# Patient Record
Sex: MALE | Race: WHITE | Employment: OTHER | URBAN - METROPOLITAN AREA
[De-identification: names, ages, dates, MRNs, and addresses within clinical notes are randomized per-mention and may not be internally consistent; named-entity substitution may affect disease eponyms.]

---

## 2019-01-25 ENCOUNTER — HOSPITAL ENCOUNTER (OUTPATIENT)
Age: 64
Discharge: HOME | End: 2019-01-25
Payer: COMMERCIAL

## 2019-01-25 LAB
INR BLD: >10.1 INDEX
PROTHROMBIN TIME: 120 SECONDS (ref 9.12–12.5)

## 2019-01-25 PROCEDURE — 36415 COLL VENOUS BLD VENIPUNCTURE: CPT

## 2019-01-25 PROCEDURE — 85610 PROTHROMBIN TIME: CPT

## 2019-07-07 ENCOUNTER — APPOINTMENT (OUTPATIENT)
Dept: CT IMAGING | Age: 64
DRG: 896 | End: 2019-07-07
Payer: COMMERCIAL

## 2019-07-07 ENCOUNTER — HOSPITAL ENCOUNTER (INPATIENT)
Age: 64
LOS: 4 days | Discharge: NURSING, REHAB OR LONG-TERM CARE | DRG: 896 | End: 2019-07-11
Attending: EMERGENCY MEDICINE | Admitting: INTERNAL MEDICINE
Payer: COMMERCIAL

## 2019-07-07 ENCOUNTER — APPOINTMENT (OUTPATIENT)
Dept: GENERAL RADIOLOGY | Age: 64
DRG: 896 | End: 2019-07-07
Payer: COMMERCIAL

## 2019-07-07 DIAGNOSIS — E87.6 HYPOKALEMIA: ICD-10-CM

## 2019-07-07 DIAGNOSIS — R41.82 ALTERED MENTAL STATUS, UNSPECIFIED ALTERED MENTAL STATUS TYPE: Primary | ICD-10-CM

## 2019-07-07 DIAGNOSIS — R45.851 SUICIDAL IDEATION: ICD-10-CM

## 2019-07-07 DIAGNOSIS — F32.A DEPRESSION, UNSPECIFIED DEPRESSION TYPE: ICD-10-CM

## 2019-07-07 DIAGNOSIS — N17.9 ACUTE RENAL FAILURE, UNSPECIFIED ACUTE RENAL FAILURE TYPE (HCC): ICD-10-CM

## 2019-07-07 LAB
ACETAMINOPHEN LEVEL: <5 UG/ML (ref 15–30)
ALBUMIN SERPL-MCNC: 4.1 GM/DL (ref 3.4–5)
ALCOHOL SCREEN SERUM: NORMAL %WT/VOL
ALP BLD-CCNC: 58 IU/L (ref 40–128)
ALT SERPL-CCNC: 12 U/L (ref 10–40)
AMMONIA: 32 UMOL/L (ref 16–60)
AMPHETAMINES: NEGATIVE
ANION GAP SERPL CALCULATED.3IONS-SCNC: 18 MMOL/L (ref 4–16)
AST SERPL-CCNC: 25 IU/L (ref 15–37)
BACTERIA: NEGATIVE /HPF
BARBITURATE SCREEN URINE: NEGATIVE
BASOPHILS ABSOLUTE: 0 K/CU MM
BASOPHILS RELATIVE PERCENT: 0.2 % (ref 0–1)
BENZODIAZEPINE SCREEN, URINE: NEGATIVE
BILIRUB SERPL-MCNC: 0.7 MG/DL (ref 0–1)
BILIRUBIN URINE: NEGATIVE MG/DL
BLOOD, URINE: ABNORMAL
BUN BLDV-MCNC: 51 MG/DL (ref 6–23)
CALCIUM SERPL-MCNC: 8.8 MG/DL (ref 8.3–10.6)
CANNABINOID SCREEN URINE: NEGATIVE
CHLORIDE BLD-SCNC: 83 MMOL/L (ref 99–110)
CLARITY: CLEAR
CO2: 29 MMOL/L (ref 21–32)
COCAINE METABOLITE: NEGATIVE
COLOR: ABNORMAL
CREAT SERPL-MCNC: 3.9 MG/DL (ref 0.9–1.3)
DIFFERENTIAL TYPE: ABNORMAL
DOSE AMOUNT: ABNORMAL
DOSE AMOUNT: ABNORMAL
DOSE TIME: ABNORMAL
DOSE TIME: ABNORMAL
EOSINOPHILS ABSOLUTE: 0 K/CU MM
EOSINOPHILS RELATIVE PERCENT: 0.2 % (ref 0–3)
GFR AFRICAN AMERICAN: 19 ML/MIN/1.73M2
GFR NON-AFRICAN AMERICAN: 16 ML/MIN/1.73M2
GLUCOSE BLD-MCNC: 105 MG/DL (ref 70–99)
GLUCOSE, URINE: NEGATIVE MG/DL
HCT VFR BLD CALC: 39 % (ref 42–52)
HEMOGLOBIN: 13.2 GM/DL (ref 13.5–18)
IMMATURE NEUTROPHIL %: 0.5 % (ref 0–0.43)
INR BLD: 8.71 INDEX
KETONES, URINE: NEGATIVE MG/DL
LACTATE: 1.3 MMOL/L (ref 0.4–2)
LEUKOCYTE ESTERASE, URINE: NEGATIVE
LIPASE: 70 IU/L (ref 13–60)
LYMPHOCYTES ABSOLUTE: 1 K/CU MM
LYMPHOCYTES RELATIVE PERCENT: 15.5 % (ref 24–44)
MCH RBC QN AUTO: 32.1 PG (ref 27–31)
MCHC RBC AUTO-ENTMCNC: 33.8 % (ref 32–36)
MCV RBC AUTO: 94.9 FL (ref 78–100)
MONOCYTES ABSOLUTE: 0.7 K/CU MM
MONOCYTES RELATIVE PERCENT: 11.4 % (ref 0–4)
NITRITE URINE, QUANTITATIVE: NEGATIVE
NUCLEATED RBC %: 0 %
OPIATES, URINE: NEGATIVE
OXYCODONE: NORMAL
PDW BLD-RTO: 11.7 % (ref 11.7–14.9)
PH, URINE: 5 (ref 5–8)
PHENCYCLIDINE, URINE: NEGATIVE
PLATELET # BLD: 173 K/CU MM (ref 140–440)
PMV BLD AUTO: 9.9 FL (ref 7.5–11.1)
POTASSIUM SERPL-SCNC: ABNORMAL MMOL/L (ref 3.5–5.1)
PRO-BNP: 2148 PG/ML
PROTEIN UA: NEGATIVE MG/DL
PROTHROMBIN TIME: 97.2 SECONDS (ref 9.12–12.5)
RBC # BLD: 4.11 M/CU MM (ref 4.6–6.2)
RBC URINE: ABNORMAL /HPF (ref 0–3)
SALICYLATE LEVEL: <0.3 MG/DL (ref 15–30)
SEGMENTED NEUTROPHILS ABSOLUTE COUNT: 4.6 K/CU MM
SEGMENTED NEUTROPHILS RELATIVE PERCENT: 72.2 % (ref 36–66)
SODIUM BLD-SCNC: 130 MMOL/L (ref 135–145)
SPECIFIC GRAVITY UA: 1 (ref 1–1.03)
TOTAL CK: 343 IU/L (ref 38–174)
TOTAL IMMATURE NEUTOROPHIL: 0.03 K/CU MM
TOTAL NUCLEATED RBC: 0 K/CU MM
TOTAL PROTEIN: 6.6 GM/DL (ref 6.4–8.2)
TRICHOMONAS: ABNORMAL /HPF
TROPONIN T: 0.01 NG/ML
TROPONIN T: <0.01 NG/ML
TSH HIGH SENSITIVITY: 1.05 UIU/ML (ref 0.27–4.2)
UROBILINOGEN, URINE: NORMAL MG/DL (ref 0.2–1)
WBC # BLD: 6.4 K/CU MM (ref 4–10.5)
WBC UA: <1 /HPF (ref 0–2)

## 2019-07-07 PROCEDURE — 70450 CT HEAD/BRAIN W/O DYE: CPT

## 2019-07-07 PROCEDURE — 6360000002 HC RX W HCPCS: Performed by: EMERGENCY MEDICINE

## 2019-07-07 PROCEDURE — 83605 ASSAY OF LACTIC ACID: CPT

## 2019-07-07 PROCEDURE — 96365 THER/PROPH/DIAG IV INF INIT: CPT

## 2019-07-07 PROCEDURE — 85025 COMPLETE CBC W/AUTO DIFF WBC: CPT

## 2019-07-07 PROCEDURE — 83735 ASSAY OF MAGNESIUM: CPT

## 2019-07-07 PROCEDURE — 85610 PROTHROMBIN TIME: CPT

## 2019-07-07 PROCEDURE — 2580000003 HC RX 258: Performed by: EMERGENCY MEDICINE

## 2019-07-07 PROCEDURE — 36415 COLL VENOUS BLD VENIPUNCTURE: CPT

## 2019-07-07 PROCEDURE — 84443 ASSAY THYROID STIM HORMONE: CPT

## 2019-07-07 PROCEDURE — 83880 ASSAY OF NATRIURETIC PEPTIDE: CPT

## 2019-07-07 PROCEDURE — 93005 ELECTROCARDIOGRAM TRACING: CPT | Performed by: EMERGENCY MEDICINE

## 2019-07-07 PROCEDURE — G0480 DRUG TEST DEF 1-7 CLASSES: HCPCS

## 2019-07-07 PROCEDURE — 96368 THER/DIAG CONCURRENT INF: CPT

## 2019-07-07 PROCEDURE — 1200000000 HC SEMI PRIVATE

## 2019-07-07 PROCEDURE — 87086 URINE CULTURE/COLONY COUNT: CPT

## 2019-07-07 PROCEDURE — 2500000003 HC RX 250 WO HCPCS: Performed by: EMERGENCY MEDICINE

## 2019-07-07 PROCEDURE — 96366 THER/PROPH/DIAG IV INF ADDON: CPT

## 2019-07-07 PROCEDURE — 6360000002 HC RX W HCPCS: Performed by: INTERNAL MEDICINE

## 2019-07-07 PROCEDURE — 82140 ASSAY OF AMMONIA: CPT

## 2019-07-07 PROCEDURE — 81001 URINALYSIS AUTO W/SCOPE: CPT

## 2019-07-07 PROCEDURE — 6370000000 HC RX 637 (ALT 250 FOR IP): Performed by: EMERGENCY MEDICINE

## 2019-07-07 PROCEDURE — 84484 ASSAY OF TROPONIN QUANT: CPT

## 2019-07-07 PROCEDURE — 82550 ASSAY OF CK (CPK): CPT

## 2019-07-07 PROCEDURE — 2580000003 HC RX 258: Performed by: INTERNAL MEDICINE

## 2019-07-07 PROCEDURE — 6370000000 HC RX 637 (ALT 250 FOR IP): Performed by: NURSE PRACTITIONER

## 2019-07-07 PROCEDURE — 80307 DRUG TEST PRSMV CHEM ANLYZR: CPT

## 2019-07-07 PROCEDURE — 71045 X-RAY EXAM CHEST 1 VIEW: CPT

## 2019-07-07 PROCEDURE — 99291 CRITICAL CARE FIRST HOUR: CPT

## 2019-07-07 PROCEDURE — 83690 ASSAY OF LIPASE: CPT

## 2019-07-07 PROCEDURE — 80053 COMPREHEN METABOLIC PANEL: CPT

## 2019-07-07 RX ORDER — POTASSIUM CHLORIDE 1.5 G/1.77G
40 POWDER, FOR SOLUTION ORAL PRN
Status: DISCONTINUED | OUTPATIENT
Start: 2019-07-07 | End: 2019-07-11 | Stop reason: HOSPADM

## 2019-07-07 RX ORDER — LORAZEPAM 1 MG/1
1 TABLET ORAL
Status: DISCONTINUED | OUTPATIENT
Start: 2019-07-07 | End: 2019-07-07

## 2019-07-07 RX ORDER — LORAZEPAM 1 MG/1
2 TABLET ORAL
Status: DISCONTINUED | OUTPATIENT
Start: 2019-07-07 | End: 2019-07-07

## 2019-07-07 RX ORDER — LORAZEPAM 2 MG/ML
4 INJECTION INTRAMUSCULAR
Status: DISCONTINUED | OUTPATIENT
Start: 2019-07-07 | End: 2019-07-07

## 2019-07-07 RX ORDER — POTASSIUM CHLORIDE 7.45 MG/ML
10 INJECTION INTRAVENOUS ONCE
Status: COMPLETED | OUTPATIENT
Start: 2019-07-07 | End: 2019-07-07

## 2019-07-07 RX ORDER — MAGNESIUM SULFATE IN WATER 40 MG/ML
2 INJECTION, SOLUTION INTRAVENOUS ONCE
Status: DISCONTINUED | OUTPATIENT
Start: 2019-07-08 | End: 2019-07-08

## 2019-07-07 RX ORDER — POTASSIUM CHLORIDE AND SODIUM CHLORIDE 900; 300 MG/100ML; MG/100ML
INJECTION, SOLUTION INTRAVENOUS CONTINUOUS
Status: DISCONTINUED | OUTPATIENT
Start: 2019-07-07 | End: 2019-07-09

## 2019-07-07 RX ORDER — LORAZEPAM 1 MG/1
4 TABLET ORAL
Status: DISCONTINUED | OUTPATIENT
Start: 2019-07-07 | End: 2019-07-07

## 2019-07-07 RX ORDER — 0.9 % SODIUM CHLORIDE 0.9 %
1000 INTRAVENOUS SOLUTION INTRAVENOUS ONCE
Status: COMPLETED | OUTPATIENT
Start: 2019-07-07 | End: 2019-07-07

## 2019-07-07 RX ORDER — LORAZEPAM 2 MG/ML
1 INJECTION INTRAMUSCULAR
Status: DISCONTINUED | OUTPATIENT
Start: 2019-07-07 | End: 2019-07-07

## 2019-07-07 RX ORDER — SODIUM CHLORIDE 0.9 % (FLUSH) 0.9 %
10 SYRINGE (ML) INJECTION PRN
Status: DISCONTINUED | OUTPATIENT
Start: 2019-07-07 | End: 2019-07-11 | Stop reason: HOSPADM

## 2019-07-07 RX ORDER — POTASSIUM CHLORIDE 20 MEQ/1
40 TABLET, EXTENDED RELEASE ORAL PRN
Status: DISCONTINUED | OUTPATIENT
Start: 2019-07-07 | End: 2019-07-11 | Stop reason: HOSPADM

## 2019-07-07 RX ORDER — POTASSIUM CHLORIDE 7.45 MG/ML
10 INJECTION INTRAVENOUS PRN
Status: DISCONTINUED | OUTPATIENT
Start: 2019-07-07 | End: 2019-07-11 | Stop reason: HOSPADM

## 2019-07-07 RX ORDER — LORAZEPAM 2 MG/ML
3 INJECTION INTRAMUSCULAR
Status: DISCONTINUED | OUTPATIENT
Start: 2019-07-07 | End: 2019-07-07

## 2019-07-07 RX ORDER — 0.9 % SODIUM CHLORIDE 0.9 %
500 INTRAVENOUS SOLUTION INTRAVENOUS ONCE
Status: COMPLETED | OUTPATIENT
Start: 2019-07-07 | End: 2019-07-08

## 2019-07-07 RX ORDER — SODIUM CHLORIDE 0.9 % (FLUSH) 0.9 %
10 SYRINGE (ML) INJECTION EVERY 12 HOURS SCHEDULED
Status: DISCONTINUED | OUTPATIENT
Start: 2019-07-07 | End: 2019-07-11 | Stop reason: HOSPADM

## 2019-07-07 RX ORDER — LORAZEPAM 2 MG/ML
2 INJECTION INTRAMUSCULAR
Status: DISCONTINUED | OUTPATIENT
Start: 2019-07-07 | End: 2019-07-07

## 2019-07-07 RX ORDER — DIAZEPAM 5 MG/1
10 TABLET ORAL ONCE
Status: COMPLETED | OUTPATIENT
Start: 2019-07-07 | End: 2019-07-07

## 2019-07-07 RX ORDER — LORAZEPAM 1 MG/1
3 TABLET ORAL
Status: DISCONTINUED | OUTPATIENT
Start: 2019-07-07 | End: 2019-07-07

## 2019-07-07 RX ORDER — WARFARIN SODIUM 1 MG/1
1 TABLET ORAL
COMMUNITY

## 2019-07-07 RX ORDER — POTASSIUM CHLORIDE 20 MEQ/1
20 TABLET, EXTENDED RELEASE ORAL ONCE
Status: COMPLETED | OUTPATIENT
Start: 2019-07-07 | End: 2019-07-07

## 2019-07-07 RX ADMIN — POTASSIUM CHLORIDE 10 MEQ: 7.46 INJECTION, SOLUTION INTRAVENOUS at 18:49

## 2019-07-07 RX ADMIN — POTASSIUM CHLORIDE AND SODIUM CHLORIDE 1 ML: 900; 300 INJECTION, SOLUTION INTRAVENOUS at 21:43

## 2019-07-07 RX ADMIN — FOLIC ACID: 5 INJECTION, SOLUTION INTRAMUSCULAR; INTRAVENOUS; SUBCUTANEOUS at 17:52

## 2019-07-07 RX ADMIN — DIAZEPAM 10 MG: 5 TABLET ORAL at 21:47

## 2019-07-07 RX ADMIN — SODIUM CHLORIDE 1000 ML: 9 INJECTION, SOLUTION INTRAVENOUS at 19:23

## 2019-07-07 RX ADMIN — SODIUM CHLORIDE 500 ML: 9 INJECTION, SOLUTION INTRAVENOUS at 23:42

## 2019-07-07 RX ADMIN — POTASSIUM CHLORIDE 20 MEQ: 20 TABLET, EXTENDED RELEASE ORAL at 18:49

## 2019-07-07 RX ADMIN — SODIUM CHLORIDE 1000 ML: 9 INJECTION, SOLUTION INTRAVENOUS at 17:51

## 2019-07-07 ASSESSMENT — ENCOUNTER SYMPTOMS
ALLERGIC/IMMUNOLOGIC NEGATIVE: 1
EYES NEGATIVE: 1
RESPIRATORY NEGATIVE: 1
GASTROINTESTINAL NEGATIVE: 1

## 2019-07-07 NOTE — ED PROVIDER NOTES
status:      Spouse name: Not on file    Number of children: Not on file    Years of education: Not on file    Highest education level: Not on file   Occupational History    Not on file   Social Needs    Financial resource strain: Not on file    Food insecurity:     Worry: Not on file     Inability: Not on file    Transportation needs:     Medical: Not on file     Non-medical: Not on file   Tobacco Use    Smoking status: Former Smoker     Years: 40.00     Types: Cigarettes   Substance and Sexual Activity    Alcohol use: No     Comment: daily \"multiple beers \"     Drug use: No    Sexual activity: Not on file   Lifestyle    Physical activity:     Days per week: Not on file     Minutes per session: Not on file    Stress: Not on file   Relationships    Social connections:     Talks on phone: Not on file     Gets together: Not on file     Attends Orthodoxy service: Not on file     Active member of club or organization: Not on file     Attends meetings of clubs or organizations: Not on file     Relationship status: Not on file    Intimate partner violence:     Fear of current or ex partner: Not on file     Emotionally abused: Not on file     Physically abused: Not on file     Forced sexual activity: Not on file   Other Topics Concern    Not on file   Social History Narrative    Not on file     Current Facility-Administered Medications   Medication Dose Route Frequency Provider Last Rate Last Dose    0.9 % sodium chloride bolus  1,000 mL Intravenous Once Cassandra Ann, DO 1,000 mL/hr at 07/07/19 1751 1,000 mL at 07/07/19 1751    sodium chloride flush 0.9 % injection 10 mL  10 mL Intravenous 2 times per day Cassandra Ann DO        sodium chloride flush 0.9 % injection 10 mL  10 mL Intravenous PRN Cassandra Ann DO        sodium chloride 0.9 % 3,025 mL with folic acid 1 mg, adult multi-vitamin with vitamin k 10 mL, thiamine 100 mg   Intravenous Daily Albert Winston,  mL/hr at sodium chloride flush 0.9 % injection 10 mL  10 mL Intravenous PRN Theodore Bannister, DO        sodium chloride 0.9 % 1,681 mL with folic acid 1 mg, adult multi-vitamin with vitamin k 10 mL, thiamine 100 mg   Intravenous Daily Theodore Bannister,  mL/hr at 07/07/19 1752      LORazepam (ATIVAN) tablet 1 mg  1 mg Oral Q1H PRN Theodore Bannister, DO        Or    LORazepam (ATIVAN) injection 1 mg  1 mg Intravenous Q1H PRN Theodore Bannister, DO        Or    LORazepam (ATIVAN) tablet 2 mg  2 mg Oral Q1H PRN Theodore Bannister, DO        Or    LORazepam (ATIVAN) injection 2 mg  2 mg Intravenous Q1H PRN Theodore Bannister, DO        Or    LORazepam (ATIVAN) tablet 3 mg  3 mg Oral Q1H PRN Theodore Bannister, DO        Or    LORazepam (ATIVAN) injection 3 mg  3 mg Intravenous Q1H PRN Theodore Bannister, DO        Or    LORazepam (ATIVAN) tablet 4 mg  4 mg Oral Q1H PRN Theodore Bannister, DO        Or    LORazepam (ATIVAN) injection 4 mg  4 mg Intravenous Q1H PRN Albert Winston, DO        0.9 % sodium chloride bolus  1,000 mL Intravenous Once Theodore Bannister, DO        potassium chloride 10 mEq/100 mL IVPB (Peripheral Line)  10 mEq Intravenous Once Theodore Bannister, DO        potassium chloride (KLOR-CON M) extended release tablet 20 mEq  20 mEq Oral Once Theodore Bannister, DO         Current Outpatient Medications   Medication Sig Dispense Refill    aspirin 81 MG tablet Take 81 mg by mouth daily.  diltiazem (CARDIZEM) 30 MG tablet Take 2 tablets by mouth 3 times daily. 90 tablet 1    clopidogrel (PLAVIX) 75 MG tablet Take 75 mg by mouth daily.  Rosuvastatin Calcium (CRESTOR PO) Take 40 mg by mouth.  enalapril (VASOTEC) 2.5 MG tablet Take 2.5 mg by mouth daily.  furosemide (LASIX) 40 MG tablet Take 20 mg by mouth daily.          Nursing Notes Reviewed    VITAL SIGNS:  ED Triage Vitals [07/07/19 1895]   Enc Vitals Group      BP 98/63      Pulse 74      Resp 16      Temp 97.4 °F (36.3 °C)      Temp Source Oral SpO2 100 %      Weight 140 lb (63.5 kg)      Height 5' 10\" (1.778 m)      Head Circumference       Peak Flow       Pain Score       Pain Loc       Pain Edu? Excl. in [address]? PHYSICAL EXAM:  Physical Exam   Constitutional: He appears well-developed and well-nourished. He is active and cooperative. Non-toxic appearance. He does not have a sickly appearance. He does not appear ill. No distress. HENT:   Head: Normocephalic and atraumatic. Right Ear: External ear normal.   Left Ear: External ear normal.   Mouth/Throat: Oropharynx is clear and moist. No oropharyngeal exudate. Eyes: Pupils are equal, round, and reactive to light. Conjunctivae and EOM are normal. Right eye exhibits no discharge. Left eye exhibits no discharge. No scleral icterus. Neck: Normal range of motion, full passive range of motion without pain and phonation normal. No JVD present. No neck rigidity. No edema, no erythema and normal range of motion present. Cardiovascular: Normal rate, regular rhythm, normal heart sounds and intact distal pulses. Exam reveals no gallop and no friction rub. No murmur heard. Pulmonary/Chest: Effort normal and breath sounds normal. No stridor. No respiratory distress. He has no wheezes. He has no rales. Abdominal: Soft. Bowel sounds are normal. He exhibits no distension and no mass. There is no tenderness. There is no rigidity, no rebound, no guarding, no tenderness at McBurney's point and negative Shahid's sign. Musculoskeletal: Normal range of motion. He exhibits no edema, tenderness or deformity. Neurological: He is alert. He has normal strength. He is disoriented. He displays no atrophy and no tremor. No cranial nerve deficit or sensory deficit. He exhibits normal muscle tone. He displays no seizure activity. Coordination normal. GCS eye subscore is 4. GCS verbal subscore is 5. GCS motor subscore is 6. Skin: Skin is warm. No rash noted. He is not diaphoretic. No erythema. No pallor. Psychiatric: His speech is delayed. He is slowed. Cognition and memory are normal. He expresses impulsivity. He exhibits a depressed mood. He expresses suicidal ideation. Nursing note and vitals reviewed.         I have reviewed andinterpreted all of the currently available lab results from this visit (if applicable):    Results for orders placed or performed during the hospital encounter of 07/07/19   CBC Auto Differential   Result Value Ref Range    WBC 6.4 4.0 - 10.5 K/CU MM    RBC 4.11 (L) 4.6 - 6.2 M/CU MM    Hemoglobin 13.2 (L) 13.5 - 18.0 GM/DL    Hematocrit 39.0 (L) 42 - 52 %    MCV 94.9 78 - 100 FL    MCH 32.1 (H) 27 - 31 PG    MCHC 33.8 32.0 - 36.0 %    RDW 11.7 11.7 - 14.9 %    Platelets 390 951 - 045 K/CU MM    MPV 9.9 7.5 - 11.1 FL    Differential Type AUTOMATED DIFFERENTIAL     Segs Relative 72.2 (H) 36 - 66 %    Lymphocytes % 15.5 (L) 24 - 44 %    Monocytes % 11.4 (H) 0 - 4 %    Eosinophils % 0.2 0 - 3 %    Basophils % 0.2 0 - 1 %    Segs Absolute 4.6 K/CU MM    Lymphocytes # 1.0 K/CU MM    Monocytes # 0.7 K/CU MM    Eosinophils # 0.0 K/CU MM    Basophils # 0.0 K/CU MM    Nucleated RBC % 0.0 %    Total Nucleated RBC 0.0 K/CU MM    Total Immature Neutrophil 0.03 K/CU MM    Immature Neutrophil % 0.5 (H) 0 - 0.43 %   CMP   Result Value Ref Range    Sodium 130 (L) 135 - 145 MMOL/L    Potassium (LL) 3.5 - 5.1 MMOL/L     2.8  K CALLED TO DR BOOKER VIA [facility] ON [id number] AT 1820 BY MAUREEN MT      Chloride 83 (L) 99 - 110 mMol/L    CO2 29 21 - 32 MMOL/L    BUN 51 (H) 6 - 23 MG/DL    CREATININE 3.9 (H) 0.9 - 1.3 MG/DL    Glucose 105 (H) 70 - 99 MG/DL    Calcium 8.8 8.3 - 10.6 MG/DL    Alb 4.1 3.4 - 5.0 GM/DL    Total Protein 6.6 6.4 - 8.2 GM/DL    Total Bilirubin 0.7 0.0 - 1.0 MG/DL    ALT 12 10 - 40 U/L    AST 25 15 - 37 IU/L    Alkaline Phosphatase 58 40 - 128 IU/L    GFR Non- 16 (L) >60 mL/min/1.73m2    GFR  19 (L) >60 mL/min/1.73m2    Anion Gap 18 (H) 4 - 16   Troponin

## 2019-07-07 NOTE — ED NOTES
Pt changed into a green gown and sitter at bedside.  Pt's clothes, wallet, and vap pen sent home with wife per pt request.      Herve Herrera RN  07/07/19 [id number]

## 2019-07-07 NOTE — ED NOTES
1828 paged hospitalist     Jamia Arboleda  07/07/19 1827  [phone] hospitalist returned call      Jamia Arboleda  07/07/19 0846

## 2019-07-07 NOTE — H&P
LORazepam (ATIVAN) injection 4 mg  4 mg Intravenous Q1H PRN Albert Winston, DO        0.9 % sodium chloride bolus  1,000 mL Intravenous Once Manny Ortega, DO         Current Outpatient Medications   Medication Sig Dispense Refill    aspirin 81 MG tablet Take 81 mg by mouth daily.  diltiazem (CARDIZEM) 30 MG tablet Take 2 tablets by mouth 3 times daily. 90 tablet 1    clopidogrel (PLAVIX) 75 MG tablet Take 75 mg by mouth daily.  Rosuvastatin Calcium (CRESTOR PO) Take 40 mg by mouth.  enalapril (VASOTEC) 2.5 MG tablet Take 2.5 mg by mouth daily.  furosemide (LASIX) 40 MG tablet Take 20 mg by mouth daily. Allergies  Allergies   Allergen Reactions    Lorazepam Other (See Comments)    Pcn [Penicillins]        REVIEW OF SYSTEMS   Within above limitations. 14 point review of systems reviewed. Pertinent positive or negative as per HPI or otherwise negative per 14 point systems review. PHYSICAL EXAM     Wt Readings from Last 3 Encounters:   07/07/19 140 lb (63.5 kg)       Blood pressure 109/80, pulse 77, temperature 97.4 °F (36.3 °C), temperature source Oral, resp. rate 16, height 5' 10\" (1.778 m), weight 140 lb (63.5 kg), SpO2 100 %. General - Awake for altered sensorium  Psych - Appropriate affect/speech. No agitation  Eyes - Eye lids intact. No scleral icterus  ENT - Lips wnl. External ear clear/dry/intact. No thyromegaly on inspection  Neuro - No gross peripheral or central neuro deficits on inspection  Heart - Sinus. RRR. S1 and S2 present. No elevated JVD appreciated  Lung - Adequate air entry b/l, No crackles/wheezes appreciated  GI - Soft. No guarding/rigidity.  BS+   - No CVA/suprapubic tenderness or palpable bladder distension    LABS AND IMAGING   CBC  [unfilled]    Last 3 Hemoglobin  Lab Results   Component Value Date    HGB 13.2 07/07/2019    HGB 12.9 02/09/2015    HGB 11.7 01/16/2015     Last 3 WBC/ANC  Lab Results   Component Value Date    WBC 6.4 07/07/2019 visualized portion of the orbits demonstrate no acute abnormality. SINUSES:  The visualized paranasal sinuses and mastoid air cells are clear. SOFT TISSUES/SKULL:  No acute abnormality of the visualized skull or soft  tissues.     Impression:       There is a rounded area of increased attenuation associated with the right  middle cerebral artery that is suggestive of an aneurysm.  CTA of the  intracranial circulation is suggested to further evaluate. Cerebral atrophy.  Chronic small vessel ischemic changes.  Atherosclerotic  calcification of the cavernous carotid arteries and vertebral arteries.     XR CHEST PORTABLE [[id number]] Collected: 07/07/19 1734     Order Status: Completed Updated: 07/07/19 1737     Narrative:       EXAMINATION:  ONE XRAY VIEW OF THE CHEST    7/7/2019 5:11 pm    COMPARISON:  02/09/2015    HISTORY:  ORDERING SYSTEM PROVIDED HISTORY: AMS  TECHNOLOGIST PROVIDED HISTORY:  Reason for exam:->AMS  Reason for Exam: AMS;suicidal  Acuity: Unknown  Type of Exam: Unknown    Initial evaluation. FINDINGS:  The patient has a left-sided pacemaker.  The trachea is midline.  The cardiac  silhouette is unremarkable.  There is stable elevation of the right  hemidiaphragm with mild scarring in the right lung base.  No obvious  superimposed acute process is identified. Espiridion Amel is scarring or a bulla in  the left lung base.     Impression:       Pacemaker.  No acute cardiopulmonary process.  Scarring in the lung bases. EKG personally reviewed with rate 73, NSR      Relevant labs and imaging reviewed    ASSESSMENT AND PLAN     Chronic alcoholism, stopped drinking 6 weeks ago now with hallucination, delusions, acute metabolic encephalopathy  - denies active suicidal ideations to me  - consult psych to assist with evaluation of organic disorder e.g psychosis  - NC-CT head with possible incidental aneurysm - CTA head in the ED pending.  This is likely incidental and unlike to explain presentation  - TSH

## 2019-07-08 PROBLEM — F33.1 MODERATE EPISODE OF RECURRENT MAJOR DEPRESSIVE DISORDER (HCC): Chronic | Status: ACTIVE | Noted: 2019-07-08

## 2019-07-08 PROBLEM — F41.1 GENERALIZED ANXIETY DISORDER: Chronic | Status: ACTIVE | Noted: 2019-07-08

## 2019-07-08 LAB
ALBUMIN SERPL-MCNC: 3.4 GM/DL (ref 3.4–5)
ALP BLD-CCNC: 50 IU/L (ref 40–129)
ALT SERPL-CCNC: 9 U/L (ref 10–40)
ANION GAP SERPL CALCULATED.3IONS-SCNC: 12 MMOL/L (ref 4–16)
AST SERPL-CCNC: 21 IU/L (ref 15–37)
BASOPHILS ABSOLUTE: 0 K/CU MM
BASOPHILS RELATIVE PERCENT: 0.1 % (ref 0–1)
BILIRUB SERPL-MCNC: 0.4 MG/DL (ref 0–1)
BILIRUB SERPL-MCNC: 0.4 MG/DL (ref 0–1)
BILIRUBIN DIRECT: 0.2 MG/DL (ref 0–0.3)
BILIRUBIN, INDIRECT: 0.2 MG/DL (ref 0–0.7)
BUN BLDV-MCNC: 45 MG/DL (ref 6–23)
CALCIUM SERPL-MCNC: 8 MG/DL (ref 8.3–10.6)
CHLORIDE BLD-SCNC: 99 MMOL/L (ref 99–110)
CO2: 29 MMOL/L (ref 21–32)
CREAT SERPL-MCNC: 3.1 MG/DL (ref 0.9–1.3)
DIFFERENTIAL TYPE: ABNORMAL
EOSINOPHILS ABSOLUTE: 0 K/CU MM
EOSINOPHILS RELATIVE PERCENT: 0.1 % (ref 0–3)
GFR AFRICAN AMERICAN: 25 ML/MIN/1.73M2
GFR NON-AFRICAN AMERICAN: 21 ML/MIN/1.73M2
GLUCOSE BLD-MCNC: 161 MG/DL (ref 70–99)
HCT VFR BLD CALC: 37.9 % (ref 42–52)
HEMOGLOBIN: 12.4 GM/DL (ref 13.5–18)
IMMATURE NEUTROPHIL %: 0.3 % (ref 0–0.43)
INR BLD: 6.85 INDEX
LACTATE: 1.4 MMOL/L (ref 0.4–2)
LV EF: 45 %
LVEF MODALITY: NORMAL
LYMPHOCYTES ABSOLUTE: 0.7 K/CU MM
LYMPHOCYTES RELATIVE PERCENT: 10.4 % (ref 24–44)
MAGNESIUM: 2.1 MG/DL (ref 1.8–2.4)
MAGNESIUM: 2.1 MG/DL (ref 1.8–2.4)
MCH RBC QN AUTO: 32.4 PG (ref 27–31)
MCHC RBC AUTO-ENTMCNC: 32.7 % (ref 32–36)
MCV RBC AUTO: 99 FL (ref 78–100)
MONOCYTES ABSOLUTE: 0.8 K/CU MM
MONOCYTES RELATIVE PERCENT: 11.6 % (ref 0–4)
NUCLEATED RBC %: 0 %
PDW BLD-RTO: 12.1 % (ref 11.7–14.9)
PLATELET # BLD: 161 K/CU MM (ref 140–440)
PMV BLD AUTO: 9.8 FL (ref 7.5–11.1)
POTASSIUM SERPL-SCNC: ABNORMAL MMOL/L (ref 3.5–5.1)
PROTHROMBIN TIME: 78 SECONDS (ref 9.12–12.5)
RBC # BLD: 3.83 M/CU MM (ref 4.6–6.2)
SEGMENTED NEUTROPHILS ABSOLUTE COUNT: 5.2 K/CU MM
SEGMENTED NEUTROPHILS RELATIVE PERCENT: 77.5 % (ref 36–66)
SODIUM BLD-SCNC: 140 MMOL/L (ref 135–145)
TOTAL IMMATURE NEUTOROPHIL: 0.02 K/CU MM
TOTAL NUCLEATED RBC: 0 K/CU MM
TOTAL PROTEIN: 5.8 GM/DL (ref 6.4–8.2)
TROPONIN T: 0.01 NG/ML
WBC # BLD: 6.7 K/CU MM (ref 4–10.5)

## 2019-07-08 PROCEDURE — 6360000002 HC RX W HCPCS: Performed by: INTERNAL MEDICINE

## 2019-07-08 PROCEDURE — 2500000003 HC RX 250 WO HCPCS: Performed by: INTERNAL MEDICINE

## 2019-07-08 PROCEDURE — 84484 ASSAY OF TROPONIN QUANT: CPT

## 2019-07-08 PROCEDURE — 82248 BILIRUBIN DIRECT: CPT

## 2019-07-08 PROCEDURE — 2580000003 HC RX 258: Performed by: INTERNAL MEDICINE

## 2019-07-08 PROCEDURE — 6370000000 HC RX 637 (ALT 250 FOR IP): Performed by: INTERNAL MEDICINE

## 2019-07-08 PROCEDURE — 93010 ELECTROCARDIOGRAM REPORT: CPT | Performed by: INTERNAL MEDICINE

## 2019-07-08 PROCEDURE — 83735 ASSAY OF MAGNESIUM: CPT

## 2019-07-08 PROCEDURE — 80053 COMPREHEN METABOLIC PANEL: CPT

## 2019-07-08 PROCEDURE — 99221 1ST HOSP IP/OBS SF/LOW 40: CPT | Performed by: NURSE PRACTITIONER

## 2019-07-08 PROCEDURE — 83605 ASSAY OF LACTIC ACID: CPT

## 2019-07-08 PROCEDURE — 2000000000 HC ICU R&B

## 2019-07-08 PROCEDURE — 6370000000 HC RX 637 (ALT 250 FOR IP): Performed by: NURSE PRACTITIONER

## 2019-07-08 PROCEDURE — 93306 TTE W/DOPPLER COMPLETE: CPT

## 2019-07-08 PROCEDURE — 85610 PROTHROMBIN TIME: CPT

## 2019-07-08 PROCEDURE — 87081 CULTURE SCREEN ONLY: CPT

## 2019-07-08 PROCEDURE — 85025 COMPLETE CBC W/AUTO DIFF WBC: CPT

## 2019-07-08 PROCEDURE — 84132 ASSAY OF SERUM POTASSIUM: CPT

## 2019-07-08 PROCEDURE — 6360000002 HC RX W HCPCS: Performed by: NURSE PRACTITIONER

## 2019-07-08 RX ORDER — ESCITALOPRAM OXALATE 10 MG/1
10 TABLET ORAL DAILY
Status: DISCONTINUED | OUTPATIENT
Start: 2019-07-09 | End: 2019-07-11 | Stop reason: HOSPADM

## 2019-07-08 RX ORDER — LANOLIN ALCOHOL/MO/W.PET/CERES
400 CREAM (GRAM) TOPICAL DAILY
COMMUNITY

## 2019-07-08 RX ORDER — SOTALOL HYDROCHLORIDE 80 MG/1
80 TABLET ORAL 2 TIMES DAILY
Status: ON HOLD | COMMUNITY
End: 2019-07-11 | Stop reason: HOSPADM

## 2019-07-08 RX ORDER — ACETAMINOPHEN,DIPHENHYDRAMINE HCL 500; 25 MG/1; MG/1
1 TABLET, FILM COATED ORAL NIGHTLY PRN
COMMUNITY

## 2019-07-08 RX ORDER — ACETAMINOPHEN 325 MG/1
650 TABLET ORAL EVERY 4 HOURS PRN
Status: DISCONTINUED | OUTPATIENT
Start: 2019-07-08 | End: 2019-07-11 | Stop reason: HOSPADM

## 2019-07-08 RX ORDER — NITROGLYCERIN 0.4 MG/1
0.4 TABLET SUBLINGUAL EVERY 5 MIN PRN
COMMUNITY

## 2019-07-08 RX ORDER — ACETAMINOPHEN 650 MG/1
650 SUPPOSITORY RECTAL EVERY 4 HOURS PRN
Status: DISCONTINUED | OUTPATIENT
Start: 2019-07-08 | End: 2019-07-11 | Stop reason: HOSPADM

## 2019-07-08 RX ORDER — POTASSIUM CHLORIDE 1.5 G/1.77G
60 POWDER, FOR SOLUTION ORAL ONCE
Status: COMPLETED | OUTPATIENT
Start: 2019-07-08 | End: 2019-07-08

## 2019-07-08 RX ORDER — LOSARTAN POTASSIUM 50 MG/1
50 TABLET ORAL DAILY
Status: ON HOLD | COMMUNITY
End: 2019-07-11 | Stop reason: HOSPADM

## 2019-07-08 RX ORDER — MIDODRINE HYDROCHLORIDE 5 MG/1
10 TABLET ORAL
Status: DISCONTINUED | OUTPATIENT
Start: 2019-07-08 | End: 2019-07-09

## 2019-07-08 RX ORDER — DIAZEPAM 5 MG/ML
5 INJECTION, SOLUTION INTRAMUSCULAR; INTRAVENOUS EVERY 4 HOURS PRN
Status: DISCONTINUED | OUTPATIENT
Start: 2019-07-08 | End: 2019-07-11 | Stop reason: HOSPADM

## 2019-07-08 RX ORDER — CILOSTAZOL 50 MG/1
50 TABLET ORAL 2 TIMES DAILY
COMMUNITY

## 2019-07-08 RX ADMIN — POTASSIUM CHLORIDE 60 MEQ: 1.5 POWDER, FOR SOLUTION ORAL at 05:56

## 2019-07-08 RX ADMIN — FOLIC ACID: 5 INJECTION, SOLUTION INTRAMUSCULAR; INTRAVENOUS; SUBCUTANEOUS at 10:35

## 2019-07-08 RX ADMIN — MIDODRINE HYDROCHLORIDE 10 MG: 5 TABLET ORAL at 12:42

## 2019-07-08 RX ADMIN — ACETAMINOPHEN 650 MG: 325 TABLET ORAL at 20:21

## 2019-07-08 RX ADMIN — NOREPINEPHRINE BITARTRATE 2 MCG/MIN: 1 INJECTION INTRAVENOUS at 02:36

## 2019-07-08 RX ADMIN — MIDODRINE HYDROCHLORIDE 10 MG: 5 TABLET ORAL at 09:27

## 2019-07-08 RX ADMIN — POTASSIUM CHLORIDE 10 MEQ: 7.46 INJECTION, SOLUTION INTRAVENOUS at 04:34

## 2019-07-08 RX ADMIN — Medication 5 MG: at 03:12

## 2019-07-08 RX ADMIN — POTASSIUM CHLORIDE AND SODIUM CHLORIDE: 900; 300 INJECTION, SOLUTION INTRAVENOUS at 19:49

## 2019-07-08 RX ADMIN — SODIUM CHLORIDE, PRESERVATIVE FREE 10 ML: 5 INJECTION INTRAVENOUS at 20:21

## 2019-07-08 RX ADMIN — POTASSIUM CHLORIDE AND SODIUM CHLORIDE: 900; 300 INJECTION, SOLUTION INTRAVENOUS at 09:43

## 2019-07-08 RX ADMIN — MIDODRINE HYDROCHLORIDE 10 MG: 5 TABLET ORAL at 17:09

## 2019-07-08 RX ADMIN — SODIUM CHLORIDE, PRESERVATIVE FREE 10 ML: 5 INJECTION INTRAVENOUS at 09:27

## 2019-07-08 ASSESSMENT — PAIN SCALES - GENERAL: PAINLEVEL_OUTOF10: 3

## 2019-07-08 NOTE — PROGRESS NOTES
PHARMACY TO DOSE COUMADIN PER DR Angelika Lee  INDICATION = Atrial fibrillation     GOAL INR = 2 - 3     HOME DOSE = 1 mg daily  DAILY INR ORDERED?  Yes    AGE = 60 y.o.  SEX = male  HEIGHT = 5' 10\" (1.778 m)  Wt Readings from Last 3 Encounters:   07/07/19 144 lb 14.4 oz (65.7 kg)     INR MONITORING  Lab Results   Component Value Date    INR 6.85 07/08/2019    INR 8.71 07/07/2019    INR >10.10 01/25/2019    INR 0.95 02/09/2015    INR 0.86 01/16/2015       DOSING PLAN:  INR on admission = 8.71 (no active bleeding)  Current INR = 6.85 supra-therapeutic  No Vitamin K ordered  Chronic alcoholism noted  Will continue to hold warfarin for now  Pharmacy will continue to monitor INR and resume dosing once INR within therapeutic range    Sarah Evangelista RPh  7/8/2019  8:27 AM

## 2019-07-08 NOTE — CARE COORDINATION
Cm met with pt to discuss discharge planning and alcohol abuse. Pt and ELOISE, Juan, reside together. Pt states that Juan does not drink. Pt states that he had an was charged with an CHELSEA approx 6 weeks ago and [facility] was [facility] Pt states that he has been through residential treatment at [facility] in the past and would like to engage in treatment with them again. 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 Pt states that he has a a period of 2 year sobriety in the past and was attending [employer] meetings at that time and plans to get involved in [employer] meetings again. Pt provided with MARINA and Lawanda meeting schedules and drug treatment program info for [employer]. Pt has an order for psych consult which is pending. Plan home.

## 2019-07-08 NOTE — CONSULTS
Inpatient consult to Psychiatry  Consult performed by: WALDEMAR Tate - CNP  Consult ordered by: Gaby Hernandez MD        Initial Psychiatric History and Physical    Nadira Terry III  [id number]  7/7/2019 07/08/19    ID: Patient is a 60 yrs y.o. male    CC: \"I got real depressed. \"    HPI: Derek Reis is a 60 y.o. male with a past medical history of HLD, CAD s/p stent, Afib, HTN, CHF,  PAD, COPD. He stopped drinking alcohol 6 weeks ago. He had been drinking 24 ounces of liquor daily. He began to develop confusion, hallucinations and delusions. He had not been able to sleep much. During today's interview he was alert and oriented x 3. He denies SI/HI/AV hallucinations. He rates his depression as \"3\" on a scale of 0 to 10 with 0 being none and 10 being horrible. He rates his anxiety as \"5\" on the same scale. He states he is sleeping \"on and off\" Notes that his appetite is \"pretty fair. \" He denies any history of issues with his temper or with increased irritability. He has been up for days at a time in the past, but believes it was when he was actively drinking. He has never attempted suicide. Family history is negative for suicide or mental illness. Past Psychiatric History: none    Family Psychiatric History:   Family History   Problem Relation Age of Onset    Heart Disease Mother     Heart Disease Father     Heart Disease Brother         Allergies:   Allergies   Allergen Reactions    Lorazepam Other (See Comments)    Pcn [Penicillins]         OBJECTIVE  Vital Signs:  Vitals:    07/08/19 2000   BP: (!) 104/92   Pulse: 82   Resp: 18   Temp:    SpO2: 99%       Labs:  Recent Results (from the past 48 hour(s))   CBC Auto Differential    Collection Time: 07/07/19  5:35 PM   Result Value Ref Range    WBC 6.4 4.0 - 10.5 K/CU MM    RBC 4.11 (L) 4.6 - 6.2 M/CU MM    Hemoglobin 13.2 (L) 13.5 - 18.0 GM/DL    Hematocrit 39.0 (L) 42 - 52 %    MCV 94.9 78 - 100 FL    MCH 32.1 (H) 27 - 31 PG    MCHC 33.8 32.0 - 36.0 %    RDW 11.7 11.7 - 14.9 %    Platelets 485 232 - 874 K/CU MM    MPV 9.9 7.5 - 11.1 FL    Differential Type AUTOMATED DIFFERENTIAL     Segs Relative 72.2 (H) 36 - 66 %    Lymphocytes % 15.5 (L) 24 - 44 %    Monocytes % 11.4 (H) 0 - 4 %    Eosinophils % 0.2 0 - 3 %    Basophils % 0.2 0 - 1 %    Segs Absolute 4.6 K/CU MM    Lymphocytes # 1.0 K/CU MM    Monocytes # 0.7 K/CU MM    Eosinophils # 0.0 K/CU MM    Basophils # 0.0 K/CU MM    Nucleated RBC % 0.0 %    Total Nucleated RBC 0.0 K/CU MM    Total Immature Neutrophil 0.03 K/CU MM    Immature Neutrophil % 0.5 (H) 0 - 0.43 %   CMP    Collection Time: 07/07/19  5:35 PM   Result Value Ref Range    Sodium 130 (L) 135 - 145 MMOL/L    Potassium (LL) 3.5 - 5.1 MMOL/L     2.8  K CALLED TO ER,  [facility] [place] [id number] [facility]      Chloride 83 (L) 99 - 110 mMol/L    CO2 29 21 - 32 MMOL/L    BUN 51 (H) 6 - 23 MG/DL    CREATININE 3.9 (H) 0.9 - 1.3 MG/DL    Glucose 105 (H) 70 - 99 MG/DL    Calcium 8.8 8.3 - 10.6 MG/DL    Alb 4.1 3.4 - 5.0 GM/DL    Total Protein 6.6 6.4 - 8.2 GM/DL    Total Bilirubin 0.7 0.0 - 1.0 MG/DL    ALT 12 10 - 40 U/L    AST 25 15 - 37 IU/L    Alkaline Phosphatase 58 40 - 128 IU/L    GFR Non- 16 (L) >60 mL/min/1.73m2    GFR  19 (L) >60 mL/min/1.73m2    Anion Gap 18 (H) 4 - 16   Troponin    Collection Time: 07/07/19  5:35 PM   Result Value Ref Range    Troponin T 0.014 (H) <0.01 NG/ML   CK    Collection Time: 07/07/19  5:35 PM   Result Value Ref Range    Total  (H) 38 - 174 IU/L   Brain Natriuretic Peptide    Collection Time: 07/07/19  5:35 PM   Result Value Ref Range    Pro-BNP 2,148 (H) <300 PG/ML   TSH without Reflex    Collection Time: 07/07/19  5:35 PM   Result Value Ref Range    TSH, High Sensitivity 1.050 0.270 - 4.20 uIu/ml   Lactic Acid, Plasma    Collection Time: 07/07/19  5:35 PM   Result Value Ref Range    Lactate 1.3 0.4 - 2.0 mMOL/L   Acetaminophen Level    Collection Time: 07/07/19  5:35 PM   Result Value Ref Range    Acetaminophen Level <5.0 (L) 15 - 30 ug/ml    DOSE AMOUNT DOSE AMT. GIVEN - UNKNOWN     DOSE TIME DOSE TIME GIVEN - UNKNOWN    Ethanol    Collection Time: 07/07/19  5:35 PM   Result Value Ref Range    Alcohol Scrn <0.01  THE VALUE IS BELOW OUR DETECTION LIMIT. <0.01 %WT/VOL   Lipase    Collection Time: 07/07/19  5:35 PM   Result Value Ref Range    Lipase 70 (H) 13 - 60 IU/L   Salicylate    Collection Time: 07/07/19  5:35 PM   Result Value Ref Range    Salicylate Lvl <7.0 (L) 15 - 30 MG/DL    DOSE AMOUNT DOSE AMT.  GIVEN - UNKNOWN     DOSE TIME DOSE TIME GIVEN - UNKNOWN    Ammonia Level    Collection Time: 07/07/19  5:43 PM   Result Value Ref Range    Ammonia 32 16 - 60 UMOL/L   EKG 12 Lead    Collection Time: 07/07/19  5:48 PM   Result Value Ref Range    Ventricular Rate 73 BPM    Atrial Rate 73 BPM    P-R Interval 160 ms    QRS Duration 94 ms    Q-T Interval 444 ms    QTc Calculation (Bazett) 489 ms    P Axis 71 degrees    R Axis 51 degrees    T Axis 32 degrees    Diagnosis       Normal sinus rhythm  Low voltage QRS  Nonspecific T wave abnormality  Prolonged QT  Abnormal ECG  No previous ECGs available  Confirmed by [facility] ALISON SIMS ([id number]) on 7/8/2019 1:44:30 PM     Urinalysis    Collection Time: 07/07/19  6:52 PM   Result Value Ref Range    Color, UA STRAW (A) YELLOW    Clarity, UA CLEAR CLEAR    Glucose, Urine NEGATIVE NEGATIVE MG/DL    Bilirubin Urine NEGATIVE NEGATIVE MG/DL    Ketones, Urine NEGATIVE NEGATIVE MG/DL    Specific Gravity, UA 1.005 1.001 - 1.035    Blood, Urine MODERATE (A) NEGATIVE    pH, Urine 5.0 5.0 - 8.0    Protein, UA NEGATIVE NEGATIVE MG/DL    Urobilinogen, Urine NORMAL 0.2 - 1.0 MG/DL    Nitrite Urine, Quantitative NEGATIVE NEGATIVE    Leukocyte Esterase, Urine NEGATIVE NEGATIVE    RBC, UA NONE SEEN 0 - 3 /HPF    WBC, UA <1 0 - 2 /HPF    Bacteria, UA NEGATIVE NEGATIVE /HPF    Trichomonas, UA NONE SEEN NONE SEEN /HPF   Urine Culture Collection Time: 07/07/19  6:52 PM   Result Value Ref Range    Specimen URINE CLEAN CATCH     Special Requests NONE     Culture CULTURE IN PROGRESS    Urine Drug Screen    Collection Time: 07/07/19  6:52 PM   Result Value Ref Range    Cannabinoid Scrn, Ur NEGATIVE NEGATIVE    Amphetamines NEGATIVE NEGATIVE    Cocaine Metabolite NEGATIVE NEGATIVE    Benzodiazepine Screen, Urine NEGATIVE NEGATIVE    Barbiturate Screen, Ur NEGATIVE NEGATIVE    Opiates, Urine NEGATIVE NEGATIVE    Phencyclidine, Urine NEGATIVE NEGATIVE    Oxycodone  NEGATIVE     NEGATIVE          THRESHOLD CONCENTRATIONS (mg/dL)  AMPHT               1000  YEIMY,OPIA             300  BZO,BAR              200  PCP                   25  THC                   50  OXY                  100          IF POSITIVE, SPECIMEN WILL BE  DISCARDED AFTER 6 MONTHS. CALL LAB IF CONFIRMATION NEEDED. ALL NEGATIVE SPECIMENS WILL BE  DISCARDED AFTER ONE WEEK. * UNCONFIRMED POSITIVES MAY  NOT MEET FORENSIC REQUIREMENTS.              Protime-INR    Collection Time: 07/07/19 10:07 PM   Result Value Ref Range    Protime 97.2 (H) 9.12 - 12.5 SECONDS    INR 8.71 (HH) INDEX   Troponin    Collection Time: 07/07/19 10:07 PM   Result Value Ref Range    Troponin T <0.010 <0.01 NG/ML   Magnesium    Collection Time: 07/07/19 10:07 PM   Result Value Ref Range    Magnesium 2.1 1.8 - 2.4 mg/dl   Protime-INR    Collection Time: 07/08/19  2:00 AM   Result Value Ref Range    Protime 78.0 (H) 9.12 - 12.5 SECONDS    INR 6.85 (HH) INDEX   Lactic acid, plasma    Collection Time: 07/08/19  2:30 AM   Result Value Ref Range    Lactate 1.4 0.4 - 2.0 mMOL/L   Comprehensive Metabolic Panel w/ Reflex to MG    Collection Time: 07/08/19  2:30 AM   Result Value Ref Range    Sodium 140 135 - 145 MMOL/L    Potassium (LL) 3.5 - 5.1 MMOL/L     2.9  K CALLED TO RACHEL MCDONOUGH RN AT 0355, [facility] MLS  RESULTS READ BACK      Chloride 99 99 - 110 mMol/L    CO2 29 21 - 32 MMOL/L    BUN 45 (H) 6 - 23 MG/DL    CREATININE 3.1 (H) 0.9 - 1.3 MG/DL    Glucose 161 (H) 70 - 99 MG/DL    Calcium 8.0 (L) 8.3 - 10.6 MG/DL    Alb 3.4 3.4 - 5.0 GM/DL    Total Protein 5.8 (L) 6.4 - 8.2 GM/DL    Total Bilirubin 0.4 0.0 - 1.0 MG/DL    ALT 9 (L) 10 - 40 U/L    AST 21 15 - 37 IU/L    Alkaline Phosphatase 50 40 - 129 IU/L    GFR Non- 21 (L) >60 mL/min/1.73m2    GFR  25 (L) >60 mL/min/1.73m2    Anion Gap 12 4 - 16   Magnesium    Collection Time: 07/08/19  2:30 AM   Result Value Ref Range    Magnesium 2.1 1.8 - 2.4 mg/dl   Indirect Bilirubin    Collection Time: 07/08/19  2:30 AM   Result Value Ref Range    Total Bilirubin 0.4 0.0 - 1.0 MG/DL    Bilirubin, Direct 0.2 0.0 - 0.3 MG/DL    Bilirubin, Indirect 0.2 0 - 0.7 MG/DL   Troponin    Collection Time: 07/08/19  2:30 AM   Result Value Ref Range    Troponin T 0.010 (H) <0.01 NG/ML   CBC Auto Differential    Collection Time: 07/08/19 12:43 PM   Result Value Ref Range    WBC 6.7 4.0 - 10.5 K/CU MM    RBC 3.83 (L) 4.6 - 6.2 M/CU MM    Hemoglobin 12.4 (L) 13.5 - 18.0 GM/DL    Hematocrit 37.9 (L) 42 - 52 %    MCV 99.0 78 - 100 FL    MCH 32.4 (H) 27 - 31 PG    MCHC 32.7 32.0 - 36.0 %    RDW 12.1 11.7 - 14.9 %    Platelets 936 058 - 362 K/CU MM    MPV 9.8 7.5 - 11.1 FL    Differential Type AUTOMATED DIFFERENTIAL     Segs Relative 77.5 (H) 36 - 66 %    Lymphocytes % 10.4 (L) 24 - 44 %    Monocytes % 11.6 (H) 0 - 4 %    Eosinophils % 0.1 0 - 3 %    Basophils % 0.1 0 - 1 %    Segs Absolute 5.2 K/CU MM    Lymphocytes # 0.7 K/CU MM    Monocytes # 0.8 K/CU MM    Eosinophils # 0.0 K/CU MM    Basophils # 0.0 K/CU MM    Nucleated RBC % 0.0 %    Total Nucleated RBC 0.0 K/CU MM    Total Immature Neutrophil 0.02 K/CU MM    Immature Neutrophil % 0.3 0 - 0.43 %       Review of Systems:  Reports of no current cardiovascular, respiratory, gastrointestinal, genitourinary, integumentary, neurological, muscuoskeletal, or immunological symptoms today. PSYCHIATRIC: See HPI above.     PSYCHIATRIC EXAMINATION / MENTAL STATUS EXAM    CONSTITUTIONAL:    Vitals:   Vitals:    07/08/19 2000   BP: (!) 104/92   Pulse: 82   Resp: 18   Temp:    SpO2: 99%      General appearance: [x] appears age, []  appears older than stated age,               [x]  adequately dressed and groomed, [] disheveled,               [x]  in no acute distress, [] appears mildly distressed, [] other           MUSCULOSKELETAL:   Gait:   [] normal, [] antalgic, [] unsteady, [] gait not evaluated   Station:             [] erect, [] sitting, [x] recumbent, [] other        Strength/tone:  [x] muscle strength and tone appear consistent with age and                                        condition     [] atrophy      [] abnormal movements  PSYCHIATRIC:    Relatedness:  [x] cooperative, [] guarded, [] indifferent, [] hostile,      [] sedated  Speech:  [x] normal prosody, [] pressured, [] decreased volume,    [] increased volume [] slurred [] slowed, [] delayed     [] echolalia, [] incoherent, [] stuttering   Eye contact:  [x] direct, [] fleeting , [] intense []  none  Kinetics:  [x] normal, [] increased, [] decreased  Mood:   [] stable, [x] depressed, [] anxious, [] irritable,     [] labile  [] euphoric   Affect:   [] normal range, [] constricted, [x] depressed , []    anxious,  [] angry, [x]  blunted     [] mood incongruent, [] blunted  [] restricted   Hallucinations:  [x] denies, [] auditory,  [] visual,  [] olfactory, [] tactile  Delusions:  [x] none, [] grandiose,  [] paranoid,  [] persecutory,     [] somatic, [] bizarre  [] Islam/spiritual    Preoccupations:   [x] none, [] violence, [] obsessions, [] other    Suicidal ideation  [x] denies, [] endorses  Homicidal ideation [x] denies, [] endorses  Thought process: [x] logical , [] circumstantial, [] tangential, [] HASEEB,     [] simplistic, [] disorganized  [] FOI  [] concrete     [] nonsensical    Thought Content: [] future oriented [x] goal directed  [] self-harm, [] guilt,     [] hopelessness

## 2019-07-08 NOTE — PROGRESS NOTES
Initial nursing shift assessment completed on Pt. Constant sitter maintained since Pt is still currently in suicide precautions. Pt is alert & oriented x 3 and appears to be calm and in no acute distress. No tremors noted. Pt currently denies any hallucinations. Pt states he is painfree. His respirations are easy & unlabored on room air and his skin is warm & dry. Pt currently denies any needs and/or concerns. Will continue to monitor him closely.

## 2019-07-09 LAB
ALBUMIN SERPL-MCNC: 3.5 GM/DL (ref 3.4–5)
ALP BLD-CCNC: 47 IU/L (ref 40–128)
ALT SERPL-CCNC: 11 U/L (ref 10–40)
ANION GAP SERPL CALCULATED.3IONS-SCNC: 13 MMOL/L (ref 4–16)
AST SERPL-CCNC: 21 IU/L (ref 15–37)
BILIRUB SERPL-MCNC: 0.4 MG/DL (ref 0–1)
BUN BLDV-MCNC: 23 MG/DL (ref 6–23)
CALCIUM SERPL-MCNC: 7.7 MG/DL (ref 8.3–10.6)
CHLORIDE BLD-SCNC: 106 MMOL/L (ref 99–110)
CO2: 23 MMOL/L (ref 21–32)
CREAT SERPL-MCNC: 1.7 MG/DL (ref 0.9–1.3)
CULTURE: NORMAL
CULTURE: NORMAL
GFR AFRICAN AMERICAN: 50 ML/MIN/1.73M2
GFR NON-AFRICAN AMERICAN: 41 ML/MIN/1.73M2
GLUCOSE BLD-MCNC: 78 MG/DL (ref 70–99)
INR BLD: 2.64 INDEX
Lab: NORMAL
Lab: NORMAL
MAGNESIUM: 1.4 MG/DL (ref 1.8–2.4)
POTASSIUM SERPL-SCNC: 4.5 MMOL/L (ref 3.5–5.1)
PROTHROMBIN TIME: 29.8 SECONDS (ref 9.12–12.5)
SODIUM BLD-SCNC: 142 MMOL/L (ref 135–145)
SPECIMEN: NORMAL
SPECIMEN: NORMAL
TOTAL PROTEIN: 5.5 GM/DL (ref 6.4–8.2)

## 2019-07-09 PROCEDURE — 6360000002 HC RX W HCPCS: Performed by: INTERNAL MEDICINE

## 2019-07-09 PROCEDURE — 80053 COMPREHEN METABOLIC PANEL: CPT

## 2019-07-09 PROCEDURE — 6370000000 HC RX 637 (ALT 250 FOR IP): Performed by: INTERNAL MEDICINE

## 2019-07-09 PROCEDURE — 83735 ASSAY OF MAGNESIUM: CPT

## 2019-07-09 PROCEDURE — 2580000003 HC RX 258: Performed by: INTERNAL MEDICINE

## 2019-07-09 PROCEDURE — 80048 BASIC METABOLIC PNL TOTAL CA: CPT

## 2019-07-09 PROCEDURE — 6370000000 HC RX 637 (ALT 250 FOR IP): Performed by: HOSPITALIST

## 2019-07-09 PROCEDURE — 6370000000 HC RX 637 (ALT 250 FOR IP): Performed by: NURSE PRACTITIONER

## 2019-07-09 PROCEDURE — 2500000003 HC RX 250 WO HCPCS: Performed by: INTERNAL MEDICINE

## 2019-07-09 PROCEDURE — 85610 PROTHROMBIN TIME: CPT

## 2019-07-09 PROCEDURE — 2140000000 HC CCU INTERMEDIATE R&B

## 2019-07-09 RX ORDER — METOPROLOL SUCCINATE 50 MG/1
50 TABLET, EXTENDED RELEASE ORAL DAILY
Status: DISCONTINUED | OUTPATIENT
Start: 2019-07-09 | End: 2019-07-11 | Stop reason: HOSPADM

## 2019-07-09 RX ORDER — ASPIRIN 81 MG/1
81 TABLET ORAL DAILY
Status: DISCONTINUED | OUTPATIENT
Start: 2019-07-09 | End: 2019-07-11 | Stop reason: HOSPADM

## 2019-07-09 RX ORDER — ALBUTEROL SULFATE 2.5 MG/3ML
2.5 SOLUTION RESPIRATORY (INHALATION) 4 TIMES DAILY
Status: DISCONTINUED | OUTPATIENT
Start: 2019-07-09 | End: 2019-07-11 | Stop reason: HOSPADM

## 2019-07-09 RX ADMIN — AMIODARONE HYDROCHLORIDE 150 MG: 900 INJECTION, SOLUTION INTRAVENOUS at 15:09

## 2019-07-09 RX ADMIN — ASPIRIN 81 MG: 81 TABLET, COATED ORAL at 15:28

## 2019-07-09 RX ADMIN — ESCITALOPRAM OXALATE 10 MG: 10 TABLET ORAL at 08:19

## 2019-07-09 RX ADMIN — Medication 5 MG: at 20:44

## 2019-07-09 RX ADMIN — AMIODARONE HYDROCHLORIDE 1 MG/MIN: 900 INJECTION, SOLUTION INTRAVENOUS at 15:28

## 2019-07-09 RX ADMIN — POTASSIUM CHLORIDE AND SODIUM CHLORIDE: 900; 300 INJECTION, SOLUTION INTRAVENOUS at 06:04

## 2019-07-09 RX ADMIN — FOLIC ACID: 5 INJECTION, SOLUTION INTRAMUSCULAR; INTRAVENOUS; SUBCUTANEOUS at 10:28

## 2019-07-09 RX ADMIN — SODIUM CHLORIDE, PRESERVATIVE FREE 10 ML: 5 INJECTION INTRAVENOUS at 08:20

## 2019-07-09 RX ADMIN — METOPROLOL SUCCINATE 50 MG: 50 TABLET, EXTENDED RELEASE ORAL at 13:08

## 2019-07-09 ASSESSMENT — PAIN SCALES - GENERAL
PAINLEVEL_OUTOF10: 0
PAINLEVEL_OUTOF10: 0

## 2019-07-09 NOTE — PROGRESS NOTES
hours.  BNP:  No results for input(s): BNP in the last 72 hours.       Assessment:  Patient Active Problem List    Diagnosis Date Noted    Moderate episode of recurrent major depressive disorder ([address] 07/08/2019     Priority: High     Class: Acute    Generalized anxiety disorder 07/08/2019     Priority: High     Class: Acute    Altered mental status 07/07/2019    SOB (shortness of breath) 02/09/2015    AICD at end of battery life 11/15/2013    Angina, class II 11/05/2013    Abnormal nuclear cardiac imaging test 11/05/2013    History of PTCA 1 11/05/2013    PVD (peripheral vascular disease) with claudication ([address] 11/05/2013    Cardiomyopathy, ischemic 11/05/2013    CHF (congestive heart failure), NYHA class II ([address] 11/05/2013    S/P implantation of automatic cardioverter/defibrillator (AICD) 11/05/2013    VT (ventricular tachycardia) ([address] 11/05/2013    Hyperlipidemia, mixed 11/05/2013    COPD, moderate ([address] 11/05/2013    CKD (chronic kidney disease), stage III ([address] 11/05/2013    Acidosis, metabolic 09/56/6076       Electronically signed by Smamie Coats PA-C on 7/9/2019 at 8:38 AM

## 2019-07-09 NOTE — PROGRESS NOTES
Status Full Code   Disposition  pending completing workup and consult rec       History of Present Illness:     Pt S&E. Feel better, BP and Cr improved, no chest pain, admit stable dyspnea due to h/o COPD, no abd pain, no N/V.     10-14 point ROS reviewed negative, unless as noted above    Objective: Intake/Output Summary (Last 24 hours) at 7/9/2019 1014  Last data filed at 7/9/2019 0826  Gross per 24 hour   Intake 2994 ml   Output 2400 ml   Net 594 ml      Vitals:   Vitals:    07/09/19 0900   BP: (!) 157/97   Pulse: 91   Resp: 29   Temp:    SpO2: 99%     Physical Exam:    GEN Awake male, cooperative, no apparent distress. RESP Decreased air sounds. Symmetric chest movement . CARDIO/VASC S1/S2 auscultated. Regular rate. GI Abdomen is soft without significant tenderness, Bowel sounds are normoactive. MSK No gross joint deformities. Spontaneous movement of all extremities  SKIN Normal coloration, warm, dry. NEURO normal speech, no lateralizing weakness. PSYCH Awake, alert, oriented x 4.       Medications:   Medications:    midodrine  10 mg Oral TID WC    warfarin (COUMADIN) daily dosing (placeholder)   Other See Admin Instructions    escitalopram  10 mg Oral Daily    sodium chloride flush  10 mL Intravenous 2 times per day    folic acid, thiamine, multi-vitamin with vitamin K infusion   Intravenous Daily      Infusions:    0.9% NaCl with KCl 40 mEq 100 mL/hr at 07/09/19 0604     PRN Meds:     diazepam 5 mg Q4H PRN   acetaminophen 650 mg Q4H PRN   acetaminophen 650 mg Q4H PRN   sodium chloride flush 10 mL PRN   potassium chloride 40 mEq PRN   Or     potassium alternative oral replacement 40 mEq PRN   Or     potassium chloride 10 mEq PRN         Electronically signed by Myrna Henson MD on 7/9/2019 at 10:14 AM

## 2019-07-10 ENCOUNTER — APPOINTMENT (OUTPATIENT)
Dept: GENERAL RADIOLOGY | Age: 64
DRG: 896 | End: 2019-07-10
Payer: COMMERCIAL

## 2019-07-10 LAB
ANION GAP SERPL CALCULATED.3IONS-SCNC: 9 MMOL/L (ref 4–16)
BUN BLDV-MCNC: 12 MG/DL (ref 6–23)
CALCIUM SERPL-MCNC: 8.1 MG/DL (ref 8.3–10.6)
CHLORIDE BLD-SCNC: 103 MMOL/L (ref 99–110)
CO2: 27 MMOL/L (ref 21–32)
CREAT SERPL-MCNC: 1.3 MG/DL (ref 0.9–1.3)
GFR AFRICAN AMERICAN: >60 ML/MIN/1.73M2
GFR NON-AFRICAN AMERICAN: 56 ML/MIN/1.73M2
GLUCOSE BLD-MCNC: 81 MG/DL (ref 70–99)
INR BLD: 2.07 INDEX
MAGNESIUM: 1.2 MG/DL (ref 1.8–2.4)
POTASSIUM SERPL-SCNC: 5.2 MMOL/L (ref 3.5–5.1)
PROTHROMBIN TIME: 23.8 SECONDS (ref 9.12–12.5)
SODIUM BLD-SCNC: 139 MMOL/L (ref 135–145)

## 2019-07-10 PROCEDURE — 6360000002 HC RX W HCPCS: Performed by: INTERNAL MEDICINE

## 2019-07-10 PROCEDURE — 6360000002 HC RX W HCPCS: Performed by: HOSPITALIST

## 2019-07-10 PROCEDURE — 80048 BASIC METABOLIC PNL TOTAL CA: CPT

## 2019-07-10 PROCEDURE — 83735 ASSAY OF MAGNESIUM: CPT

## 2019-07-10 PROCEDURE — 85610 PROTHROMBIN TIME: CPT

## 2019-07-10 PROCEDURE — 94761 N-INVAS EAR/PLS OXIMETRY MLT: CPT

## 2019-07-10 PROCEDURE — 36415 COLL VENOUS BLD VENIPUNCTURE: CPT

## 2019-07-10 PROCEDURE — 71045 X-RAY EXAM CHEST 1 VIEW: CPT

## 2019-07-10 PROCEDURE — 2700000000 HC OXYGEN THERAPY PER DAY

## 2019-07-10 PROCEDURE — 6370000000 HC RX 637 (ALT 250 FOR IP): Performed by: INTERNAL MEDICINE

## 2019-07-10 PROCEDURE — 2580000003 HC RX 258: Performed by: INTERNAL MEDICINE

## 2019-07-10 PROCEDURE — 2140000000 HC CCU INTERMEDIATE R&B

## 2019-07-10 PROCEDURE — 51798 US URINE CAPACITY MEASURE: CPT

## 2019-07-10 PROCEDURE — 6370000000 HC RX 637 (ALT 250 FOR IP): Performed by: NURSE PRACTITIONER

## 2019-07-10 PROCEDURE — 6370000000 HC RX 637 (ALT 250 FOR IP): Performed by: PHYSICIAN ASSISTANT

## 2019-07-10 PROCEDURE — 6370000000 HC RX 637 (ALT 250 FOR IP): Performed by: HOSPITALIST

## 2019-07-10 RX ORDER — MAGNESIUM SULFATE IN WATER 40 MG/ML
2 INJECTION, SOLUTION INTRAVENOUS ONCE
Status: COMPLETED | OUTPATIENT
Start: 2019-07-10 | End: 2019-07-10

## 2019-07-10 RX ORDER — FUROSEMIDE 10 MG/ML
40 INJECTION INTRAMUSCULAR; INTRAVENOUS ONCE
Status: COMPLETED | OUTPATIENT
Start: 2019-07-10 | End: 2019-07-10

## 2019-07-10 RX ORDER — ARIPIPRAZOLE 5 MG/1
5 TABLET ORAL DAILY
Status: DISCONTINUED | OUTPATIENT
Start: 2019-07-10 | End: 2019-07-11 | Stop reason: HOSPADM

## 2019-07-10 RX ORDER — FUROSEMIDE 20 MG/1
20 TABLET ORAL 2 TIMES DAILY
Status: DISCONTINUED | OUTPATIENT
Start: 2019-07-11 | End: 2019-07-11

## 2019-07-10 RX ORDER — MIDODRINE HYDROCHLORIDE 5 MG/1
5 TABLET ORAL
Status: DISCONTINUED | OUTPATIENT
Start: 2019-07-10 | End: 2019-07-11 | Stop reason: HOSPADM

## 2019-07-10 RX ORDER — AMIODARONE HYDROCHLORIDE 200 MG/1
200 TABLET ORAL DAILY
Status: DISCONTINUED | OUTPATIENT
Start: 2019-07-10 | End: 2019-07-11 | Stop reason: HOSPADM

## 2019-07-10 RX ADMIN — Medication 5 MG: at 09:45

## 2019-07-10 RX ADMIN — AMIODARONE HYDROCHLORIDE 0.5 MG/MIN: 50 INJECTION, SOLUTION INTRAVENOUS at 00:10

## 2019-07-10 RX ADMIN — Medication 5 MG: at 15:06

## 2019-07-10 RX ADMIN — MAGNESIUM SULFATE HEPTAHYDRATE 2 G: 40 INJECTION, SOLUTION INTRAVENOUS at 09:09

## 2019-07-10 RX ADMIN — SODIUM CHLORIDE, PRESERVATIVE FREE 10 ML: 5 INJECTION INTRAVENOUS at 20:28

## 2019-07-10 RX ADMIN — APIXABAN 5 MG: 5 TABLET, FILM COATED ORAL at 20:27

## 2019-07-10 RX ADMIN — Medication 5 MG: at 04:19

## 2019-07-10 RX ADMIN — FUROSEMIDE 40 MG: 10 INJECTION, SOLUTION INTRAMUSCULAR; INTRAVENOUS at 14:05

## 2019-07-10 RX ADMIN — ASPIRIN 81 MG: 81 TABLET, COATED ORAL at 09:09

## 2019-07-10 RX ADMIN — APIXABAN 5 MG: 5 TABLET, FILM COATED ORAL at 14:05

## 2019-07-10 RX ADMIN — ESCITALOPRAM OXALATE 10 MG: 10 TABLET ORAL at 09:09

## 2019-07-10 RX ADMIN — ARIPIPRAZOLE 5 MG: 5 TABLET ORAL at 14:05

## 2019-07-10 RX ADMIN — METOPROLOL SUCCINATE 50 MG: 50 TABLET, EXTENDED RELEASE ORAL at 09:09

## 2019-07-10 RX ADMIN — AMIODARONE HYDROCHLORIDE 200 MG: 200 TABLET ORAL at 14:05

## 2019-07-10 ASSESSMENT — PAIN SCALES - GENERAL
PAINLEVEL_OUTOF10: 0

## 2019-07-10 NOTE — PROGRESS NOTES
Daily Progress Note       I have seen ,spoken to  and examined this patient personally, independently of the Physician assistant . I have reviewed the hospital care given to date and reviewed all pertinent labs and imaging. The plan was developed mutually at the time of the visit with the patient,  PA  and myself. I have spoken with patient, nursing staff and provided written and verbal instructions . The above note has been reviewed and I agree with the assessment, diagnosis, and treatment plan with changes made by me as follows     CARDIOLOGY ATTENDING ADDENDUM    HPI:  I have reviewed the above HPI  And agree with above   Zuleima Vallejo is a 60 y. o.year old who and presents with had concerns including Altered Mental Status (since he stopped drinking alcohol 6 weeks ago); Mental Health Problem; and Suicidal.  Chief Complaint   Patient presents with    Altered Mental Status     since he stopped drinking alcohol 6 weeks ago   [address] Problem    Suicidal     Interval history:  Patient is awake alert but some confusion   No chest pain   Remain in sinus rate is stable  Will continue  Coreg and amiodarone and AC for afib  Change to NOAC   increase activity  Acute renal failure resolved  Hx of CAD and HFreF   Correct mag    Physical Exam:  General:  Awake and agitated  Head:normal  Eye:normal  Neck:  No JVD   Chest:  Clear to auscultation, respiration easy  Cardiovascular:  Sinus   Abdomen:   nontender  Extremities:  No edema    Pulses; palpable  Neuro: grossly normal      MEDICAL DECISION MAKING;    I agree with the above plan, which was planned by myself and discussed with PA. Joie Cee Electronically signed by Manjinder Zaidi MD [id number] [facility] on 7/10/2019 at 11:17 AM     Pt.  Awake, alert and feeling ok  HR stable, NSR, BP is stable-low on exam d/t valium but stable  Denies CP, SOB, no LE edema     CAD s/p PCI    Last [facility] 2015-mod disease noted    Trop is elevated but no ACS    No symptoms    EF 40-50%    Stable for acetaminophen, sodium chloride flush, potassium chloride **OR** potassium alternative oral replacement **OR** potassium chloride       Physical Exam:  Vitals:    07/10/19 0900   BP: 137/88   Pulse: 67   Resp: 19   Temp: 98 °F (36.7 °C)   SpO2:         General: AAO, NAD  Chest: Nontender  Cardiac: First and Second Heart Sounds are Normal, No Murmurs or Gallops noted  Lungs:Clear to auscultation and percussion. Abdomen: Soft, NT, ND, +BS  Extremities: No clubbing, no edema  Vascular:  Equal 2+ peripheral pulses. Lab Data:  CBC:   Recent Labs     07/07/19  1735 07/08/19  1243   WBC 6.4 6.7   HGB 13.2* 12.4*   HCT 39.0* 37.9*   MCV 94.9 99.0    161     BMP:   Recent Labs     07/08/19  0230 07/09/19  0543 07/10/19  0312    142 139   K 2.9  K CALLED TO RACHEL MCDONOUGH RN AT 0355, [facility] MLS  RESULTS READ BACK  * 4.5 5.2*   CL 99 106 103   CO2 29 23 27   BUN 45* 23 12   CREATININE 3.1* 1.7* 1.3     LIVER PROFILE:   Recent Labs     07/07/19  1735 07/08/19  0230 07/09/19  0543   AST 25 21 21   ALT 12 9* 11   LIPASE 70*  --   --    BILIDIR  --  0.2  --    BILITOT 0.7 0.4  0.4 0.4   ALKPHOS 58 50 47     PT/INR:   Recent Labs     07/08/19  0200 07/09/19  0545 07/10/19  0312   PROTIME 78.0* 29.8* 23.8*   INR 6.85* 2.64 2.07     APTT: No results for input(s): APTT in the last 72 hours. BNP:  No results for input(s): BNP in the last 72 hours.       Assessment:  Patient Active Problem List    Diagnosis Date Noted    Moderate episode of recurrent major depressive disorder ([facility] 07/08/2019     Priority: High     Class: Acute    Generalized anxiety disorder 07/08/2019     Priority: High     Class: Acute    Altered mental status 07/07/2019    SOB (shortness of breath) 02/09/2015    AICD at end of battery life 11/15/2013    Angina, class II 11/05/2013    Abnormal nuclear cardiac imaging test 11/05/2013    History of PTCA 1 11/05/2013    PVD (peripheral vascular disease) with claudication ([facility] 11/05/2013   

## 2019-07-10 NOTE — PROGRESS NOTES
suicidal thought, stated that he is in the right place to end it all, state he has done so much damage to himself, already too late, admit suicidal thoughts for 2 months on and off. Placed pt back on suicidal precautions, sitter, re consult psych. >Lipase elevated  - Mild, no evidence of pancreatitis, suspect alcohol related       Diet DIET GENERAL;   DVT Prophylaxis Pt on warfarin, elevated INR   Code Status Full Code   Disposition  pending completing workup and consult rec       History of Present Illness:     Pt S&E. Pt totally different person today, slow to communicate, depressed looking, oriented x 3, admit depression, admit suicidal thought, stated that he is in the right place to end it all, state he has done so much damage to himself, already too late, admit suicidal thoughts for 2 months on and off. Placed pt back on suicidal precautions, sitter, re consult psych. 10-14 point ROS reviewed negative, unless as noted above    Objective: Intake/Output Summary (Last 24 hours) at 7/10/2019 1005  Last data filed at 7/10/2019 0734  Gross per 24 hour   Intake 1707 ml   Output 965 ml   Net 742 ml      Vitals:   Vitals:    07/10/19 0900   BP: 137/88   Pulse: 67   Resp: 19   Temp: 98 °F (36.7 °C)   SpO2:      Physical Exam:    GEN Awake male, cooperative, no apparent distress. RESP Decreased air sounds. Symmetric chest movement . CARDIO/VASC S1/S2 auscultated. Regular rate. GI Abdomen is soft without significant tenderness, Bowel sounds are normoactive. MSK No gross joint deformities. Spontaneous movement of all extremities  SKIN Normal coloration, warm, dry. NEURO normal speech, no lateralizing weakness.   PSYCH Awake, alert, oriented x 4.  depressed    Medications:   Medications:    magnesium sulfate  2 g Intravenous Once    albuterol  2.5 mg Nebulization 4x daily    metoprolol succinate  50 mg Oral Daily    aspirin  81 mg Oral Daily    escitalopram  10 mg Oral Daily    sodium

## 2019-07-10 NOTE — CARE COORDINATION
has informed CM that pt is now suicidal.  Pt had a Psych eval on 07/08 with Sivakumar MANUEL. Pt's nurse to notify her that pt is suicidal.  Pt has a sitter.  TE

## 2019-07-11 VITALS
BODY MASS INDEX: 20.09 KG/M2 | DIASTOLIC BLOOD PRESSURE: 91 MMHG | HEIGHT: 70 IN | SYSTOLIC BLOOD PRESSURE: 117 MMHG | WEIGHT: 140.3 LBS | TEMPERATURE: 97.5 F | RESPIRATION RATE: 21 BRPM | OXYGEN SATURATION: 99 % | HEART RATE: 73 BPM

## 2019-07-11 LAB
ALBUMIN SERPL-MCNC: 2.6 GM/DL (ref 3.4–5)
ALBUMIN SERPL-MCNC: 3.7 GM/DL (ref 3.4–5)
ALP BLD-CCNC: 46 IU/L (ref 40–128)
ALP BLD-CCNC: 70 IU/L (ref 40–129)
ALT SERPL-CCNC: 38 U/L (ref 10–40)
ALT SERPL-CCNC: 9 U/L (ref 10–40)
ANION GAP SERPL CALCULATED.3IONS-SCNC: 12 MMOL/L (ref 4–16)
ANION GAP SERPL CALCULATED.3IONS-SCNC: 6 MMOL/L (ref 4–16)
AST SERPL-CCNC: 21 IU/L (ref 15–37)
AST SERPL-CCNC: 32 IU/L (ref 15–37)
BILIRUB SERPL-MCNC: 0.6 MG/DL (ref 0–1)
BILIRUB SERPL-MCNC: 0.9 MG/DL (ref 0–1)
BUN BLDV-MCNC: 11 MG/DL (ref 6–23)
BUN BLDV-MCNC: 28 MG/DL (ref 6–23)
CALCIUM SERPL-MCNC: 7.7 MG/DL (ref 8.3–10.6)
CALCIUM SERPL-MCNC: 8.2 MG/DL (ref 8.3–10.6)
CHLORIDE BLD-SCNC: 101 MMOL/L (ref 99–110)
CHLORIDE BLD-SCNC: 98 MMOL/L (ref 99–110)
CO2: 28 MMOL/L (ref 21–32)
CO2: 30 MMOL/L (ref 21–32)
CREAT SERPL-MCNC: 1.2 MG/DL (ref 0.9–1.3)
CREAT SERPL-MCNC: 1.3 MG/DL (ref 0.9–1.3)
EKG ATRIAL RATE: 73 BPM
EKG DIAGNOSIS: NORMAL
EKG P AXIS: 71 DEGREES
EKG P-R INTERVAL: 160 MS
EKG Q-T INTERVAL: 444 MS
EKG QRS DURATION: 94 MS
EKG QTC CALCULATION (BAZETT): 489 MS
EKG R AXIS: 51 DEGREES
EKG T AXIS: 32 DEGREES
EKG VENTRICULAR RATE: 73 BPM
GFR AFRICAN AMERICAN: >60 ML/MIN/1.73M2
GFR AFRICAN AMERICAN: >60 ML/MIN/1.73M2
GFR NON-AFRICAN AMERICAN: 56 ML/MIN/1.73M2
GFR NON-AFRICAN AMERICAN: >60 ML/MIN/1.73M2
GLUCOSE BLD-MCNC: 76 MG/DL (ref 70–99)
GLUCOSE BLD-MCNC: 81 MG/DL (ref 70–99)
INR BLD: 3.41 INDEX
MAGNESIUM: 1.5 MG/DL (ref 1.8–2.4)
POTASSIUM SERPL-SCNC: 3.4 MMOL/L (ref 3.5–5.1)
POTASSIUM SERPL-SCNC: 4 MMOL/L (ref 3.5–5.1)
PROTHROMBIN TIME: 39.1 SECONDS (ref 9.12–12.5)
SODIUM BLD-SCNC: 137 MMOL/L (ref 135–145)
SODIUM BLD-SCNC: 138 MMOL/L (ref 135–145)
TOTAL PROTEIN: 4.9 GM/DL (ref 6.4–8.2)
TOTAL PROTEIN: 5.8 GM/DL (ref 6.4–8.2)

## 2019-07-11 PROCEDURE — 6370000000 HC RX 637 (ALT 250 FOR IP): Performed by: HOSPITALIST

## 2019-07-11 PROCEDURE — 36415 COLL VENOUS BLD VENIPUNCTURE: CPT

## 2019-07-11 PROCEDURE — 6360000002 HC RX W HCPCS: Performed by: INTERNAL MEDICINE

## 2019-07-11 PROCEDURE — 2580000003 HC RX 258: Performed by: INTERNAL MEDICINE

## 2019-07-11 PROCEDURE — 6370000000 HC RX 637 (ALT 250 FOR IP): Performed by: NURSE PRACTITIONER

## 2019-07-11 PROCEDURE — 6370000000 HC RX 637 (ALT 250 FOR IP): Performed by: INTERNAL MEDICINE

## 2019-07-11 PROCEDURE — 6370000000 HC RX 637 (ALT 250 FOR IP): Performed by: PHYSICIAN ASSISTANT

## 2019-07-11 PROCEDURE — 85610 PROTHROMBIN TIME: CPT

## 2019-07-11 PROCEDURE — 80053 COMPREHEN METABOLIC PANEL: CPT

## 2019-07-11 PROCEDURE — 83735 ASSAY OF MAGNESIUM: CPT

## 2019-07-11 RX ORDER — POTASSIUM CHLORIDE 20 MEQ/1
20 TABLET, EXTENDED RELEASE ORAL 2 TIMES DAILY WITH MEALS
Status: DISCONTINUED | OUTPATIENT
Start: 2019-07-11 | End: 2019-07-11 | Stop reason: HOSPADM

## 2019-07-11 RX ORDER — FUROSEMIDE 40 MG/1
40 TABLET ORAL DAILY
Status: DISCONTINUED | OUTPATIENT
Start: 2019-07-12 | End: 2019-07-11 | Stop reason: HOSPADM

## 2019-07-11 RX ORDER — WARFARIN SODIUM 2.5 MG/1
2.5 TABLET ORAL DAILY
Status: DISCONTINUED | OUTPATIENT
Start: 2019-07-11 | End: 2019-07-11 | Stop reason: DRUGHIGH

## 2019-07-11 RX ORDER — FUROSEMIDE 40 MG/1
40 TABLET ORAL DAILY
Qty: 60 TABLET | Refills: 1 | Status: SHIPPED | OUTPATIENT
Start: 2019-07-11

## 2019-07-11 RX ORDER — METOPROLOL SUCCINATE 50 MG/1
50 TABLET, EXTENDED RELEASE ORAL DAILY
Qty: 30 TABLET | Refills: 1 | Status: SHIPPED | OUTPATIENT
Start: 2019-07-12

## 2019-07-11 RX ORDER — POTASSIUM CHLORIDE 20 MEQ/1
20 TABLET, EXTENDED RELEASE ORAL 2 TIMES DAILY WITH MEALS
Qty: 60 TABLET | Refills: 0 | Status: SHIPPED | OUTPATIENT
Start: 2019-07-11

## 2019-07-11 RX ORDER — ESCITALOPRAM OXALATE 10 MG/1
10 TABLET ORAL DAILY
Qty: 30 TABLET | Refills: 1 | Status: SHIPPED | OUTPATIENT
Start: 2019-07-12

## 2019-07-11 RX ORDER — ARIPIPRAZOLE 5 MG/1
5 TABLET ORAL DAILY
Qty: 30 TABLET | Refills: 1 | Status: SHIPPED | OUTPATIENT
Start: 2019-07-12

## 2019-07-11 RX ORDER — MIDODRINE HYDROCHLORIDE 5 MG/1
5 TABLET ORAL
Qty: 90 TABLET | Refills: 1 | Status: SHIPPED | OUTPATIENT
Start: 2019-07-11

## 2019-07-11 RX ORDER — AMIODARONE HYDROCHLORIDE 200 MG/1
200 TABLET ORAL DAILY
Qty: 30 TABLET | Refills: 1 | Status: SHIPPED | OUTPATIENT
Start: 2019-07-12

## 2019-07-11 RX ORDER — MAGNESIUM SULFATE IN WATER 40 MG/ML
2 INJECTION, SOLUTION INTRAVENOUS ONCE
Status: COMPLETED | OUTPATIENT
Start: 2019-07-11 | End: 2019-07-11

## 2019-07-11 RX ADMIN — Medication 5 MG: at 16:02

## 2019-07-11 RX ADMIN — MAGNESIUM SULFATE HEPTAHYDRATE 2 G: 40 INJECTION, SOLUTION INTRAVENOUS at 07:00

## 2019-07-11 RX ADMIN — AMIODARONE HYDROCHLORIDE 200 MG: 200 TABLET ORAL at 09:46

## 2019-07-11 RX ADMIN — MIDODRINE HYDROCHLORIDE 5 MG: 5 TABLET ORAL at 16:24

## 2019-07-11 RX ADMIN — SODIUM CHLORIDE, PRESERVATIVE FREE 10 ML: 5 INJECTION INTRAVENOUS at 09:47

## 2019-07-11 RX ADMIN — ARIPIPRAZOLE 5 MG: 5 TABLET ORAL at 09:46

## 2019-07-11 RX ADMIN — ASPIRIN 81 MG: 81 TABLET, COATED ORAL at 09:46

## 2019-07-11 RX ADMIN — ESCITALOPRAM OXALATE 10 MG: 10 TABLET ORAL at 09:46

## 2019-07-11 RX ADMIN — APIXABAN 5 MG: 5 TABLET, FILM COATED ORAL at 09:46

## 2019-07-11 RX ADMIN — POTASSIUM CHLORIDE 20 MEQ: 1.5 POWDER, FOR SOLUTION ORAL at 16:27

## 2019-07-11 RX ADMIN — MIDODRINE HYDROCHLORIDE 5 MG: 5 TABLET ORAL at 09:47

## 2019-07-11 RX ADMIN — POTASSIUM CHLORIDE 40 MEQ: 1.5 POWDER, FOR SOLUTION ORAL at 09:52

## 2019-07-11 RX ADMIN — METOPROLOL SUCCINATE 50 MG: 50 TABLET, EXTENDED RELEASE ORAL at 09:47

## 2019-07-11 RX ADMIN — MIDODRINE HYDROCHLORIDE 5 MG: 5 TABLET ORAL at 12:52

## 2019-07-11 RX ADMIN — FUROSEMIDE 20 MG: 20 TABLET ORAL at 09:46

## 2019-07-11 ASSESSMENT — PAIN SCALES - GENERAL
PAINLEVEL_OUTOF10: 0

## 2019-07-11 NOTE — PROGRESS NOTES
times per day      Infusions:     PRN Meds:  diazepam, acetaminophen, acetaminophen, sodium chloride flush, potassium chloride **OR** potassium alternative oral replacement **OR** potassium chloride       Physical Exam:  Vitals:    07/11/19 1500   BP: (!) 117/91   Pulse: 73   Resp: 21   Temp: 97.5 °F (36.4 °C)   SpO2:         General: awake alert   Chest: Nontender  Cardiac: sinus   Lungs:Clear to auscultation and percussion. Abdomen: Soft, NT, ND, +BS  Extremities: no edema   Vascular:  Equal 2+ peripheral pulses. Lab Data:  CBC: No results for input(s): WBC, HGB, HCT, MCV, PLT in the last 72 hours. BMP:   Recent Labs     07/10/19  0312 07/11/19  0247 07/11/19  0630    138 137   K 5.2* 4.0 3.4*    98* 101   CO2 27 28 30   BUN 12 11 28*   CREATININE 1.3 1.2 1.3     LIVER PROFILE:   Recent Labs     07/09/19  0543 07/11/19  0247 07/11/19  0630   AST 21 21 32   ALT 11 9* 38   BILITOT 0.4 0.6 0.9   ALKPHOS 47 46 70     PT/INR:   Recent Labs     07/09/19  0545 07/10/19  0312 07/11/19  0247   PROTIME 29.8* 23.8* 39.1*   INR 2.64 2.07 3.41     APTT: No results for input(s): APTT in the last 72 hours. BNP:  No results for input(s): BNP in the last 72 hours.       Assessment:  Patient Active Problem List    Diagnosis Date Noted    Moderate [facility] recurrent [facility] 07/08/2019     Priority: High     Class: Acute    Generalized anxiety disorder 07/08/2019     Priority: High     Class: Acute    Altered mental status 07/07/2019    SOB (shortness of breath) 02/09/2015    AICD at end of battery life 11/15/2013    Angina, class II 11/05/2013    Abnormal nuclear cardiac imaging test 11/05/2013    History of PTCA 1 11/05/2013    PVD (peripheral vascular disease) with claudication ([facility] 11/05/2013    Cardiomyopathy, ischemic 11/05/2013    CHF (congestive heart failure), NYHA class II ([facility] 11/05/2013    S/P implantation of automatic cardioverter/defibrillator (AICD)

## 2019-07-11 NOTE — DISCHARGE SUMMARY
s/p stents  >Reported AFib hx on coumadin AC  >Reported hx of chronic CHF   >Troponemia most likely sec to renal failure, no ACS  - he is unable to confirm med or cardiac condition but is able to tell me that he follows with cardiology Dr Pavan Laughlin [address]. consulted to assist with medication management/optimization  - medications adjusted,   - dyspnea , pt had IVF, lasix been on hold, will give on IV dose, restart home dose, CXR with no acute pathology  - pt state he is at his baseline with dyspnea, no wheezing on exam,O2 100%, assess for home oxygen     >SEAN  >Acute hypoK  >Mild HypoNa  - most likely pre renal, hypovolemia  - resolved s/p IVF      >Lipase elevated  - sec to alcohol  - Mild, no evidence of pancreatitis,     The patient expressed appropriate understanding of and agreement with the discharge recommendations, medications, and plan.      Consults this admission:  IP CONSULT TO PSYCHOLOGY  IP CONSULT TO HOSPITALIST  IP CONSULT TO PSYCHIATRY  IP CONSULT TO PHARMACY  IP CONSULT TO CARDIOLOGY  IP CONSULT TO NEPHROLOGY  IP CONSULT TO SOCIAL WORK  IP CONSULT TO PSYCHIATRY    Discharge Instruction:   Follow up appointments:     See AVS    Disposition: Discharged to:   inpt Psych  Condition on discharge: Stable    Discharge Medications:      Misty Sicks III   Home Medication Instructions [id number]    Printed on:07/11/19 6805   Medication Information                      amiodarone (CORDARONE) 200 MG tablet  Take 1 tablet by mouth daily             ARIPiprazole (ABILIFY) 5 MG tablet  Take 1 tablet by mouth daily             aspirin 81 MG tablet  Take 1 tablet by mouth daily             cilostazol (PLETAL) 50 MG tablet  Take 50 mg by mouth 2 times daily             diphenhydrAMINE-APAP, sleep, (TYLENOL PM EXTRA STRENGTH)  MG tablet  Take 1 tablet by mouth nightly as needed for Sleep             escitalopram (LEXAPRO) 10 MG tablet  Take 1 tablet by mouth daily             folic acid (Caprice Clayman)

## 2019-07-11 NOTE — PROGRESS NOTES
Med trans expected p/u [phone]. @ [phone] this RN called med trans who state their schedule has pt p/u at 2045.  Pt and [facility] informed of p/u time change

## 2019-07-11 NOTE — CARE COORDINATION
[address] informed CM that [facility] has agreed to accept pt. They faxed the condition of admission forms to CM. Pt reviewed them and signed the forms. Forms faxed to [facility]. [facility] will notify CM or charge nurse when transport will be here to  pt.   MAGO

## 2019-07-11 NOTE — FLOWSHEET NOTE
07/11/19 1500   Mobility   Activity Ambulate in [place]   Level of Assistance Contact guard assist, steading assist   Assistive Device None   Distance Ambulated (ft) 50 ft   Ambulation Response Tolerated fairly well

## 2019-07-12 NOTE — PROGRESS NOTES
Report called to [facility] transport arrived @ 2052, sup assist to stretcher.  Pt A&Ox3 at d/c sitter at bedside

## 2021-09-30 ENCOUNTER — HOSPITAL ENCOUNTER (OUTPATIENT)
Age: 66
Discharge: HOME | End: 2021-09-30
Payer: COMMERCIAL

## 2021-09-30 ENCOUNTER — HOSPITAL ENCOUNTER (OUTPATIENT)
Dept: GENERAL RADIOLOGY | Age: 66
Discharge: HOME | End: 2021-09-30
Payer: COMMERCIAL

## 2021-09-30 DIAGNOSIS — R06.02 SOB (SHORTNESS OF BREATH): ICD-10-CM

## 2021-09-30 LAB
ALBUMIN SERPL-MCNC: 4 GM/DL (ref 3.4–5)
ALP BLD-CCNC: 131 IU/L (ref 40–129)
ALT SERPL-CCNC: 70 U/L (ref 10–40)
AST SERPL-CCNC: 107 IU/L (ref 15–37)
BILIRUB SERPL-MCNC: 0.3 MG/DL (ref 0–1)
BILIRUBIN DIRECT: 0.2 MG/DL (ref 0–0.3)
BILIRUBIN, INDIRECT: 0.1 MG/DL (ref 0–0.7)
T4 FREE: 1.68 NG/DL (ref 0.9–1.8)
TOTAL PROTEIN: 6.6 GM/DL (ref 6.4–8.2)
TSH HIGH SENSITIVITY: 2.05 UIU/ML (ref 0.27–4.2)

## 2021-09-30 PROCEDURE — 36415 COLL VENOUS BLD VENIPUNCTURE: CPT

## 2021-09-30 PROCEDURE — 84443 ASSAY THYROID STIM HORMONE: CPT

## 2021-09-30 PROCEDURE — 80076 HEPATIC FUNCTION PANEL: CPT

## 2021-09-30 PROCEDURE — 84439 ASSAY OF FREE THYROXINE: CPT

## 2021-09-30 PROCEDURE — 71046 X-RAY EXAM CHEST 2 VIEWS: CPT
